# Patient Record
Sex: FEMALE | Race: WHITE | NOT HISPANIC OR LATINO | Employment: UNEMPLOYED | URBAN - METROPOLITAN AREA
[De-identification: names, ages, dates, MRNs, and addresses within clinical notes are randomized per-mention and may not be internally consistent; named-entity substitution may affect disease eponyms.]

---

## 2017-04-19 LAB
C TRACH RRNA SPEC DONR QL NAA+PROBE: NORMAL
EXTERNAL ABO GROUPING: (no result)
EXTERNAL ANTIBODY SCREEN: NORMAL
EXTERNAL HEMATOCRIT: 38 %
EXTERNAL HEMOGLOBIN: 12.7 G/DL
EXTERNAL HEPATITIS B SURFACE ANTIGEN: NEGATIVE
EXTERNAL RH FACTOR: NEGATIVE
EXTERNAL SYPHILIS RPR SCREEN: (no result)
HIV 1+2 AB+HIV1 P24 AG SERPL QL IA: NON REACTIVE
N GONORRHOEA DNA SPEC QL NAA+PROBE: NORMAL
RUBV IGG SERPL IA-ACNC: (no result)

## 2017-07-20 LAB — GLUCOSE 1H P 100 G GLC PO SERPL-MCNC: 111 MG/DL

## 2017-09-22 ENCOUNTER — TELEPHONE (OUTPATIENT)
Dept: OBSTETRICS AND GYNECOLOGY | Facility: CLINIC | Age: 22
End: 2017-09-22

## 2017-09-22 NOTE — TELEPHONE ENCOUNTER
----- Message from Irlanda Montgomery sent at 9/22/2017 12:01 PM EDT -----  Contact: Pt  Ob pt called to schedule ON appt.  States she just moved here from [facility], is 32-34 weeks gestation, with no high risk factors.  Pt will come in today or Monday to sign HOME.  Is it ok to schedule pt with you?    Pt # [phone]

## 2017-09-26 ENCOUNTER — INITIAL PRENATAL (OUTPATIENT)
Dept: OBSTETRICS AND GYNECOLOGY | Facility: CLINIC | Age: 22
End: 2017-09-26

## 2017-09-26 VITALS
DIASTOLIC BLOOD PRESSURE: 76 MMHG | SYSTOLIC BLOOD PRESSURE: 123 MMHG | HEIGHT: 61 IN | BODY MASS INDEX: 32.66 KG/M2 | WEIGHT: 173 LBS

## 2017-09-26 DIAGNOSIS — Z34.83 MULTIGRAVIDA IN THIRD TRIMESTER: Primary | ICD-10-CM

## 2017-09-26 DIAGNOSIS — O99.333 TOBACCO SMOKING AFFECTING PREGNANCY IN THIRD TRIMESTER: ICD-10-CM

## 2017-09-26 DIAGNOSIS — Z23 NEED FOR TDAP VACCINATION: ICD-10-CM

## 2017-09-26 DIAGNOSIS — Z11.3 SCREEN FOR STD (SEXUALLY TRANSMITTED DISEASE): ICD-10-CM

## 2017-09-26 DIAGNOSIS — F19.91 HISTORY OF INTRAVENOUS DRUG USE IN REMISSION: ICD-10-CM

## 2017-09-26 DIAGNOSIS — F15.11 HISTORY OF METHAMPHETAMINE ABUSE (HCC): ICD-10-CM

## 2017-09-26 DIAGNOSIS — Z11.4 SCREENING FOR HIV (HUMAN IMMUNODEFICIENCY VIRUS): ICD-10-CM

## 2017-09-26 PROCEDURE — 99204 OFFICE O/P NEW MOD 45 MIN: CPT | Performed by: OBSTETRICS & GYNECOLOGY

## 2017-09-26 PROCEDURE — 90715 TDAP VACCINE 7 YRS/> IM: CPT | Performed by: OBSTETRICS & GYNECOLOGY

## 2017-09-26 PROCEDURE — 90471 IMMUNIZATION ADMIN: CPT | Performed by: OBSTETRICS & GYNECOLOGY

## 2017-09-26 RX ORDER — PRENATAL VIT/IRON FUM/FOLIC AC 27MG-0.8MG
TABLET ORAL DAILY
COMMUNITY
End: 2017-11-02

## 2017-09-26 NOTE — PROGRESS NOTES
Chief complaint: Pregnancy  History present illness: Patient is seen today for the first time this pregnancy by me.  She has had prenatal care at another facility, but recently moved to this area.  She does not have her prenatal records with her today.  Patient is without major complaints at this time.  She reports that her pregnancy has been uncomplicated to this point.  She does report a history of fast labor in her last delivery.  Patient does admit to a history of methamphetamine use and IV drug use prior to this pregnancy.  She reports that she quit just before this pregnancy.  She denies any drug use during this pregnancy.  She is unsure if she is ever been tested for hepatitis C.  She is a smoker.  She has cut down to about a half pack per day.    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     Social History Main Topics   • Smoking status: Current Every Day Smoker     Packs/day: 0.50   • Smokeless tobacco: Never Used      Comment: Discussed risks of smoking in pregnancy; full cessation encouraged   • Alcohol use Yes      Comment: Former - quit at the start of pregnancy   • Drug use: Yes     Special: Methamphetamines      Comment: Pt reports she quit at the start of the pregnancy  "  • Sexual activity: Yes     Other Topics Concern   • Not on file     Social History Narrative   • No narrative on file     Meds:  PNV  Tylenol    Allergies   Allergen Reactions   • Morphine And Related    Pt is able to tolerate oxycodone and hydrocodone with no effects    ROS:  General: No fever or chills  Constitutional: No weight loss or gain, no hair loss  HENT: No headache, no hearing loss, no tinnitus  Eyes: normal vision, no eye pain  Lungs: No cough, no shortness of breath  Heart: No chest pain, no palpitations  Abdomen: No nausea, vomiting, constipation or diarrhea  : No dysuria, no hematuria  Skin: No rashes  Lymph: No swelling  Neuro: No parathesia, no weakness  Psych: Normal though content, no hallucinations, no SI/HI    PE:  Vitals:    17 0959 17 1001   BP: 123/76    Weight: 173 lb (78.5 kg)    Height:  61\" (154.9 cm)   See prenatal physical in flowsheet    Assessment:  1.  21-year-old  2 para 1 at 33-6/7 weeks gestational age based on last menstrual period and consistent with a 20 week ultrasound  2.  History of methamphetamine use and IV drug abuse  3.  Tobacco usage in pregnancy  4.  Depression and anxiety, psychiatric illness in pregnancy    Plan:  1.  Initial pregnancy counseling performed today.  There was some question about the patient's dating.  Initially, we had her last menstrual period of  2/10/17, which I believe was an error.  Her initial ultrasound in the outside setting shows a last visit.  Of 17, which gives an estimated due date of 17, which is consistent with her ultrasound on 17.  I was unable to obtain her records until after the patient had already left the office.  I have reviewed her records at length.  I have updated are flowsheet with those records.  2.  We've discussed the risk of drug usage in pregnancy.  Specifically, I discussed the risks of methamphetamine use including the risk of fetal growth restriction,  labor,  " delivery, placental abruption, hypertensive disorders of pregnancy, etc.  Complete abstinence is recommended.  3.  Discussed the risks of tobacco usage during pregnancy.  Complete cessation of tobacco products is recommended.  4.  We will obtain an ultrasound at her next visit to evaluate for fetal growth given her history of drug use and tobacco usage.  5.  I'll repeat her drug screen today.  I would also repeat HIV and hepatitis C testing today.  6.  Refills of prenatal vitamins were provided today.  7.  Patient reports a history of depression and anxiety.  She reports that she was treated with Depakote prior to this pregnancy, but discontinued it because of side effects.  She has also previously taken Prozac for control of her depression and anxiety, which she reports has been helpful in the past.  She does report a history of postpartum depression after her last delivery.  She would like to plan to start Prozac again after delivery.  8.  Return to the office 2 weeks.  Ultrasound next visit for evaluation of growth.  I spent 30 out of 45 minutes with the patient in face to face counseling of the above issues.

## 2017-09-27 LAB
HCV AB S/CO SERPL IA: <0.1 S/CO RATIO (ref 0–0.9)
HIV 1+2 AB+HIV1 P24 AG SERPL QL IA: NON REACTIVE

## 2017-09-29 LAB
AMPHETAMINES SERPL QL SCN: NEGATIVE NG/ML
BARBITURATES SERPL QL SCN: NEGATIVE UG/ML
BENZODIAZ SERPL QL SCN: NEGATIVE NG/ML
CANNABINOIDS SERPL QL SCN: NEGATIVE NG/ML
COCAINE+BZE SERPL QL SCN: NEGATIVE NG/ML
METHADONE SERPL QL SCN: NEGATIVE NG/ML
OPIATES SERPL QL SCN: NEGATIVE NG/ML
OXYCODONE+OXYMORPHONE SERPLBLD QL SCN: NEGATIVE NG/ML
PCP SERPL QL SCN: NEGATIVE NG/ML
PROPOXYPH SERPL QL SCN: NEGATIVE NG/ML

## 2017-10-12 ENCOUNTER — ROUTINE PRENATAL (OUTPATIENT)
Dept: OBSTETRICS AND GYNECOLOGY | Facility: CLINIC | Age: 22
End: 2017-10-12

## 2017-10-12 ENCOUNTER — PROCEDURE VISIT (OUTPATIENT)
Dept: OBSTETRICS AND GYNECOLOGY | Facility: CLINIC | Age: 22
End: 2017-10-12

## 2017-10-12 VITALS — DIASTOLIC BLOOD PRESSURE: 74 MMHG | SYSTOLIC BLOOD PRESSURE: 105 MMHG | WEIGHT: 176 LBS | BODY MASS INDEX: 33.25 KG/M2

## 2017-10-12 DIAGNOSIS — O26.843 UTERINE SIZE DATE DISCREPANCY PREGNANCY, THIRD TRIMESTER: Primary | ICD-10-CM

## 2017-10-12 DIAGNOSIS — Z34.83 MULTIGRAVIDA IN THIRD TRIMESTER: Primary | ICD-10-CM

## 2017-10-12 DIAGNOSIS — O99.333 TOBACCO SMOKING AFFECTING PREGNANCY IN THIRD TRIMESTER: ICD-10-CM

## 2017-10-12 PROBLEM — Z11.4 SCREENING FOR HIV (HUMAN IMMUNODEFICIENCY VIRUS): Status: RESOLVED | Noted: 2017-09-26 | Resolved: 2017-10-12

## 2017-10-12 PROBLEM — Z23 NEED FOR TDAP VACCINATION: Status: RESOLVED | Noted: 2017-09-26 | Resolved: 2017-10-12

## 2017-10-12 PROBLEM — Z11.3 SCREEN FOR STD (SEXUALLY TRANSMITTED DISEASE): Status: RESOLVED | Noted: 2017-09-26 | Resolved: 2017-10-12

## 2017-10-12 PROCEDURE — 99213 OFFICE O/P EST LOW 20 MIN: CPT | Performed by: OBSTETRICS & GYNECOLOGY

## 2017-10-12 PROCEDURE — 76816 OB US FOLLOW-UP PER FETUS: CPT | Performed by: OBSTETRICS & GYNECOLOGY

## 2017-10-12 NOTE — PROGRESS NOTES
Complaint: Pregnancy, pelvic pressure  History present was: Patient is here for her routine prenatal visit.  She notes some pelvic pressure and also some occasional mild contractions.  Good fetal movement.  No vaginal bleeding or leakage of fluid.  Objective: See vital signs in flowsheet  Gen.: No acute distress, awake and oriented ×3  Abdomen: Soft, nontender, fetal heart tones 155  Cervix: 2 cm dilated, 30% effaced, -2 station  Extremities: No lower extremity edema, no tenderness  Labs: Repeat hepatitis C and HIV tests were negative  Assessment:  1.  22-year-old  2 para 1 at 36 and one sevenths weeks gestational age  2.  Previous history of methamphetamine and opioid abuse, none currently  Plan:  1.  We discussed ultrasound findings today.  We also discussed patient's dating criteria.  I feel the patient is most accurately dated by her early ultrasound in 2017, giving an estimated due date of 17.  Findings discussed with patient.  Recent lab results discussed as well.  GBS performed today.  Labor signs discussed.  Return to the office in 1 week.  I spent 10 out of 15 minutes with the patient in face to face counseling of the above issues.

## 2017-10-13 ENCOUNTER — TELEPHONE (OUTPATIENT)
Dept: OBSTETRICS AND GYNECOLOGY | Facility: CLINIC | Age: 22
End: 2017-10-13

## 2017-10-13 NOTE — TELEPHONE ENCOUNTER
----- Message from Oksana Hanna sent at 10/12/2017  4:51 PM EDT -----  Pt called with concerns of having spotting after ob f/u today, per Mariza advise pt that is normal to have little spotting after exam. If spotting get any worse over night she needs to go to [facility] ER.

## 2017-10-17 LAB — B-HEM STREP SPEC QL CULT: POSITIVE

## 2017-10-19 ENCOUNTER — ROUTINE PRENATAL (OUTPATIENT)
Dept: OBSTETRICS AND GYNECOLOGY | Facility: CLINIC | Age: 22
End: 2017-10-19

## 2017-10-19 VITALS — BODY MASS INDEX: 33.44 KG/M2 | DIASTOLIC BLOOD PRESSURE: 68 MMHG | WEIGHT: 177 LBS | SYSTOLIC BLOOD PRESSURE: 116 MMHG

## 2017-10-19 DIAGNOSIS — O99.820 GROUP B STREPTOCOCCAL CARRIAGE COMPLICATING PREGNANCY: ICD-10-CM

## 2017-10-19 DIAGNOSIS — Z23 NEEDS FLU SHOT: Primary | ICD-10-CM

## 2017-10-19 DIAGNOSIS — Z34.83 MULTIGRAVIDA IN THIRD TRIMESTER: ICD-10-CM

## 2017-10-19 PROCEDURE — 90471 IMMUNIZATION ADMIN: CPT | Performed by: OBSTETRICS & GYNECOLOGY

## 2017-10-19 PROCEDURE — 90656 IIV3 VACC NO PRSV 0.5 ML IM: CPT | Performed by: OBSTETRICS & GYNECOLOGY

## 2017-10-19 PROCEDURE — 99213 OFFICE O/P EST LOW 20 MIN: CPT | Performed by: OBSTETRICS & GYNECOLOGY

## 2017-10-19 NOTE — PROGRESS NOTES
Pt received flu vaccine lot# [id number] exp: 6/30/2018 NDC# [id number]. Rt deltoid. No reaction. MORTEZA

## 2017-10-19 NOTE — PROGRESS NOTES
Chief complaint: Pregnancy  History present was: Patient is here for her routine prenatal visit.  She has no major complaints.  She reports some pelvic pressure.  No contractions.  Good fetal movement.  No vaginal discharge or bleeding today.  Objective: See vital signs in flowsheet  Gen.: No acute distress, awake and oriented ×3  Abdomen: Soft, nontender, fetal heart tones 145  Cervix: 2 cm, 30% effaced, -2 station  Extremities: No lower extremity edema, no tenderness  Labs: GBS positive  Assessment:  1.  22-year-old  2 para 1 at 37 and one sevenths weeks gestational age  Plan:  1.  Labor signs discussed with the patient.  GBS results discussed.  Discussed need for penicillin in labor.  All questions answered.  Return to the office in 1 week.  I spent 10 out of 15 minutes with the patient in face to face counseling of the above issues.

## 2017-10-26 ENCOUNTER — ROUTINE PRENATAL (OUTPATIENT)
Dept: OBSTETRICS AND GYNECOLOGY | Facility: CLINIC | Age: 22
End: 2017-10-26

## 2017-10-26 VITALS — SYSTOLIC BLOOD PRESSURE: 112 MMHG | DIASTOLIC BLOOD PRESSURE: 63 MMHG | WEIGHT: 179 LBS | BODY MASS INDEX: 33.82 KG/M2

## 2017-10-26 DIAGNOSIS — Z34.83 MULTIGRAVIDA IN THIRD TRIMESTER: Primary | ICD-10-CM

## 2017-10-26 PROCEDURE — 99213 OFFICE O/P EST LOW 20 MIN: CPT | Performed by: OBSTETRICS & GYNECOLOGY

## 2017-10-26 NOTE — PROGRESS NOTES
Chief complaint: Pregnancy  History of present illness: Patient is here for her routine prenatal visit.  She has no major complaints.  She notes some mild low back pain at night.  Occasional contractions.  No vaginal bleeding or leakage of fluid.  Good fetal movement  Objective: See vital signs in flowsheet  Gen.: No acute distress, awake and oriented ×3  Abdomen: Soft, nontender, nondistended, fundal height 38 tenderness, fetal heart tones 140  Cervix: 3 cm dilated, 30% effaced, -2 station  Extremities: 1+ lower extremity edema, no tenderness  Assessment:  1.  22-year-old  2 para 1 at 38 and one sevenths weeks gestational age  2.  GBS positive  3.  Rh-  4.  Remote history of IV drug abuse and methamphetamine use, none this pregnancy  Plan:  1.  Labor signs discussed with the patient.  Discussed options of expectant management of pregnancy versus labor induction.  The patient prefers to wait spontaneous labor.  This is the best plan.  Labor signs discussed.  Return to the office in 1 week.  I spent 10 out of 15 minutes with the patient in face to face counseling of the above issues.

## 2017-11-02 ENCOUNTER — ROUTINE PRENATAL (OUTPATIENT)
Dept: OBSTETRICS AND GYNECOLOGY | Facility: CLINIC | Age: 22
End: 2017-11-02

## 2017-11-02 VITALS — SYSTOLIC BLOOD PRESSURE: 119 MMHG | WEIGHT: 181 LBS | BODY MASS INDEX: 34.2 KG/M2 | DIASTOLIC BLOOD PRESSURE: 67 MMHG

## 2017-11-02 DIAGNOSIS — Z34.83 MULTIGRAVIDA IN THIRD TRIMESTER: Primary | ICD-10-CM

## 2017-11-02 PROCEDURE — 99213 OFFICE O/P EST LOW 20 MIN: CPT | Performed by: OBSTETRICS & GYNECOLOGY

## 2017-11-02 NOTE — PROGRESS NOTES
Chief complaint: Pregnancy  History present was: Patient is here for routine prenatal visit.  She has no major complaints.  Good fetal movement.  No leakage of fluid.  Otherwise doing well.  Objective: See vital signs in flowsheet  Gen.: No acute distress, awake and oriented ×3  Abdomen: Soft, nontender, fetal heart tones 155  Cervix: 3 cm dilated, 40% effaced, -2 station; membranes stripped, scant bloody discharge, no significant bleeding  Extremities: 1+ lower extremity edema, no tenderness  Assessment:  1.  22-year-old  2 para 1 at 39 and one sevenths weeks gestational age  Plan:  1.  Labor signs discussed with the patient.  Precautions given.  If no labor, return to the office in 1 week for routine follow-up.  I spent 10 out of 15 minutes with the patient in face to face counseling of the above issues.

## 2017-11-07 ENCOUNTER — TELEPHONE (OUTPATIENT)
Dept: OBSTETRICS AND GYNECOLOGY | Facility: CLINIC | Age: 22
End: 2017-11-07

## 2017-11-07 NOTE — TELEPHONE ENCOUNTER
----- Message from Cynthia Reyer sent at 11/7/2017  2:04 PM EST -----  Pt says every day since Thursday when you stripped her membranes she has been leaking slimy fluid. She describes it as not enough to soak her underwear but to be bothersome. She is also sick with a cold. Symptoms are stuffy nose & coughing. She is using a nasal spray. Is it going to be a problem when she delivers that she is sick? [phone] is the best # to reach her while her phone is turned off.

## 2017-11-07 NOTE — TELEPHONE ENCOUNTER
It does not seem like her cold symptoms are severe enough to be a problem.  His only she is not having fevers more than 101, trouble breathing, severe pain.  She should be okay.  Mucousy discharge is normal.  If she is having copious discharge enough to soak her clothes, heavy vaginal bleeding, or regular painful contractions she should go to labor and delivery to be evaluated.

## 2017-11-09 ENCOUNTER — HOSPITAL ENCOUNTER (INPATIENT)
Facility: HOSPITAL | Age: 22
LOS: 2 days | Discharge: HOME | End: 2017-11-11
Attending: OBSTETRICS & GYNECOLOGY | Admitting: OBSTETRICS & GYNECOLOGY

## 2017-11-09 ENCOUNTER — ANESTHESIA EVENT (OUTPATIENT)
Dept: LABOR AND DELIVERY | Facility: HOSPITAL | Age: 22
End: 2017-11-09

## 2017-11-09 ENCOUNTER — ANESTHESIA (OUTPATIENT)
Dept: LABOR AND DELIVERY | Facility: HOSPITAL | Age: 22
End: 2017-11-09

## 2017-11-09 DIAGNOSIS — Z86.59 HISTORY OF DEPRESSION: ICD-10-CM

## 2017-11-09 PROBLEM — Z34.90 PREGNANCY: Status: ACTIVE | Noted: 2017-11-09

## 2017-11-09 LAB
ABO GROUP BLD: NORMAL
ABO GROUP BLD: NORMAL
AMPHET+METHAMPHET UR QL: NEGATIVE
BARBITURATES UR QL SCN: NEGATIVE
BENZODIAZ UR QL SCN: NEGATIVE
BLD GP AB SCN SERPL QL: NEGATIVE
CANNABINOIDS SERPL QL: NEGATIVE
COCAINE UR QL: NEGATIVE
DEPRECATED RDW RBC AUTO: 47.1 FL (ref 37–54)
ERYTHROCYTE [DISTWIDTH] IN BLOOD BY AUTOMATED COUNT: 14.2 % (ref 11.7–13)
EXPIRATION DATE: ABNORMAL
HCT VFR BLD AUTO: 37.4 % (ref 35.6–45.5)
HGB BLD-MCNC: 12.2 G/DL (ref 11.9–15.5)
Lab: ABNORMAL
MCH RBC QN AUTO: 29.4 PG (ref 26.9–32)
MCHC RBC AUTO-ENTMCNC: 32.6 G/DL (ref 32.4–36.3)
MCV RBC AUTO: 90.1 FL (ref 80.5–98.2)
METHADONE UR QL SCN: NEGATIVE
OPIATES UR QL: NEGATIVE
OXYCODONE UR QL SCN: NEGATIVE
PLATELET # BLD AUTO: 202 10*3/MM3 (ref 140–500)
PMV BLD AUTO: 10.9 FL (ref 6–12)
PROT UR STRIP-MCNC: ABNORMAL MG/DL
RBC # BLD AUTO: 4.15 10*6/MM3 (ref 3.9–5.2)
RH BLD: NEGATIVE
RH BLD: NEGATIVE
WBC NRBC COR # BLD: 18 10*3/MM3 (ref 4.5–10.7)

## 2017-11-09 PROCEDURE — 80307 DRUG TEST PRSMV CHEM ANLYZR: CPT | Performed by: OBSTETRICS & GYNECOLOGY

## 2017-11-09 PROCEDURE — 25010000002 PENICILLIN G POTASSIUM PER 600000 UNITS: Performed by: OBSTETRICS & GYNECOLOGY

## 2017-11-09 PROCEDURE — 86901 BLOOD TYPING SEROLOGIC RH(D): CPT | Performed by: OBSTETRICS & GYNECOLOGY

## 2017-11-09 PROCEDURE — 86900 BLOOD TYPING SEROLOGIC ABO: CPT | Performed by: OBSTETRICS & GYNECOLOGY

## 2017-11-09 PROCEDURE — 0HQ9XZZ REPAIR PERINEUM SKIN, EXTERNAL APPROACH: ICD-10-PCS | Performed by: OBSTETRICS & GYNECOLOGY

## 2017-11-09 PROCEDURE — 86850 RBC ANTIBODY SCREEN: CPT | Performed by: OBSTETRICS & GYNECOLOGY

## 2017-11-09 PROCEDURE — 85027 COMPLETE CBC AUTOMATED: CPT | Performed by: OBSTETRICS & GYNECOLOGY

## 2017-11-09 PROCEDURE — 10907ZC DRAINAGE OF AMNIOTIC FLUID, THERAPEUTIC FROM PRODUCTS OF CONCEPTION, VIA NATURAL OR ARTIFICIAL OPENING: ICD-10-PCS | Performed by: OBSTETRICS & GYNECOLOGY

## 2017-11-09 PROCEDURE — 3E03329 INTRODUCTION OF OTHER ANTI-INFECTIVE INTO PERIPHERAL VEIN, PERCUTANEOUS APPROACH: ICD-10-PCS | Performed by: OBSTETRICS & GYNECOLOGY

## 2017-11-09 PROCEDURE — C1755 CATHETER, INTRASPINAL: HCPCS | Performed by: ANESTHESIOLOGY

## 2017-11-09 PROCEDURE — 81002 URINALYSIS NONAUTO W/O SCOPE: CPT | Performed by: OBSTETRICS & GYNECOLOGY

## 2017-11-09 PROCEDURE — 59409 OBSTETRICAL CARE: CPT | Performed by: OBSTETRICS & GYNECOLOGY

## 2017-11-09 RX ORDER — SODIUM CHLORIDE, SODIUM LACTATE, POTASSIUM CHLORIDE, CALCIUM CHLORIDE 600; 310; 30; 20 MG/100ML; MG/100ML; MG/100ML; MG/100ML
125 INJECTION, SOLUTION INTRAVENOUS CONTINUOUS
Status: DISCONTINUED | OUTPATIENT
Start: 2017-11-09 | End: 2017-11-09

## 2017-11-09 RX ORDER — LANOLIN 100 %
OINTMENT (GRAM) TOPICAL
Status: DISCONTINUED | OUTPATIENT
Start: 2017-11-09 | End: 2017-11-11 | Stop reason: HOSPADM

## 2017-11-09 RX ORDER — DOCUSATE SODIUM 100 MG/1
100 CAPSULE, LIQUID FILLED ORAL 2 TIMES DAILY
Status: DISCONTINUED | OUTPATIENT
Start: 2017-11-09 | End: 2017-11-11 | Stop reason: HOSPADM

## 2017-11-09 RX ORDER — IBUPROFEN 800 MG/1
800 TABLET ORAL EVERY 8 HOURS PRN
Status: DISCONTINUED | OUTPATIENT
Start: 2017-11-09 | End: 2017-11-11 | Stop reason: HOSPADM

## 2017-11-09 RX ORDER — OXYTOCIN/RINGER'S LACTATE 10/500ML
2-20 PLASTIC BAG, INJECTION (ML) INTRAVENOUS
Status: DISCONTINUED | OUTPATIENT
Start: 2017-11-09 | End: 2017-11-09

## 2017-11-09 RX ORDER — BISACODYL 10 MG
10 SUPPOSITORY, RECTAL RECTAL DAILY PRN
Status: DISCONTINUED | OUTPATIENT
Start: 2017-11-10 | End: 2017-11-11 | Stop reason: HOSPADM

## 2017-11-09 RX ORDER — CARBOPROST TROMETHAMINE 250 UG/ML
250 INJECTION, SOLUTION INTRAMUSCULAR AS NEEDED
Status: DISCONTINUED | OUTPATIENT
Start: 2017-11-09 | End: 2017-11-09 | Stop reason: HOSPADM

## 2017-11-09 RX ORDER — HYDROCODONE BITARTRATE AND ACETAMINOPHEN 5; 325 MG/1; MG/1
1 TABLET ORAL EVERY 6 HOURS PRN
Status: DISCONTINUED | OUTPATIENT
Start: 2017-11-09 | End: 2017-11-11 | Stop reason: HOSPADM

## 2017-11-09 RX ORDER — MISOPROSTOL 200 UG/1
TABLET ORAL
Status: COMPLETED
Start: 2017-11-09 | End: 2017-11-09

## 2017-11-09 RX ORDER — PROMETHAZINE HYDROCHLORIDE 12.5 MG/1
12.5 TABLET ORAL EVERY 4 HOURS PRN
Status: DISCONTINUED | OUTPATIENT
Start: 2017-11-09 | End: 2017-11-11 | Stop reason: HOSPADM

## 2017-11-09 RX ORDER — MISOPROSTOL 200 UG/1
800 TABLET ORAL AS NEEDED
Status: DISCONTINUED | OUTPATIENT
Start: 2017-11-09 | End: 2017-11-11 | Stop reason: HOSPADM

## 2017-11-09 RX ORDER — METHYLERGONOVINE MALEATE 0.2 MG/ML
200 INJECTION INTRAVENOUS ONCE AS NEEDED
Status: DISCONTINUED | OUTPATIENT
Start: 2017-11-09 | End: 2017-11-09 | Stop reason: HOSPADM

## 2017-11-09 RX ORDER — OXYTOCIN/RINGER'S LACTATE 10/500ML
125 PLASTIC BAG, INJECTION (ML) INTRAVENOUS CONTINUOUS PRN
Status: DISCONTINUED | OUTPATIENT
Start: 2017-11-09 | End: 2017-11-09 | Stop reason: HOSPADM

## 2017-11-09 RX ORDER — MISOPROSTOL 200 UG/1
800 TABLET ORAL AS NEEDED
Status: DISCONTINUED | OUTPATIENT
Start: 2017-11-09 | End: 2017-11-09 | Stop reason: HOSPADM

## 2017-11-09 RX ORDER — LIDOCAINE HYDROCHLORIDE 10 MG/ML
5 INJECTION, SOLUTION INFILTRATION; PERINEURAL AS NEEDED
Status: DISCONTINUED | OUTPATIENT
Start: 2017-11-09 | End: 2017-11-09 | Stop reason: HOSPADM

## 2017-11-09 RX ORDER — ZOLPIDEM TARTRATE 5 MG/1
5 TABLET ORAL NIGHTLY PRN
Status: DISCONTINUED | OUTPATIENT
Start: 2017-11-09 | End: 2017-11-11 | Stop reason: HOSPADM

## 2017-11-09 RX ORDER — OXYTOCIN/RINGER'S LACTATE 10/500ML
999 PLASTIC BAG, INJECTION (ML) INTRAVENOUS ONCE
Status: DISCONTINUED | OUTPATIENT
Start: 2017-11-09 | End: 2017-11-09 | Stop reason: HOSPADM

## 2017-11-09 RX ORDER — ONDANSETRON 2 MG/ML
4 INJECTION INTRAMUSCULAR; INTRAVENOUS ONCE AS NEEDED
Status: DISCONTINUED | OUTPATIENT
Start: 2017-11-09 | End: 2017-11-09 | Stop reason: HOSPADM

## 2017-11-09 RX ORDER — PENICILLIN G 3000000 [IU]/50ML
3 INJECTION, SOLUTION INTRAVENOUS EVERY 4 HOURS
Status: DISCONTINUED | OUTPATIENT
Start: 2017-11-09 | End: 2017-11-09 | Stop reason: HOSPADM

## 2017-11-09 RX ORDER — SODIUM CHLORIDE 0.9 % (FLUSH) 0.9 %
1-10 SYRINGE (ML) INJECTION AS NEEDED
Status: DISCONTINUED | OUTPATIENT
Start: 2017-11-09 | End: 2017-11-11 | Stop reason: HOSPADM

## 2017-11-09 RX ORDER — SODIUM CHLORIDE 0.9 % (FLUSH) 0.9 %
1-10 SYRINGE (ML) INJECTION AS NEEDED
Status: DISCONTINUED | OUTPATIENT
Start: 2017-11-09 | End: 2017-11-09 | Stop reason: HOSPADM

## 2017-11-09 RX ORDER — LIDOCAINE HYDROCHLORIDE 15 MG/ML
INJECTION, SOLUTION EPIDURAL; INFILTRATION; INTRACAUDAL; PERINEURAL AS NEEDED
Status: DISCONTINUED | OUTPATIENT
Start: 2017-11-09 | End: 2017-11-09 | Stop reason: SURG

## 2017-11-09 RX ORDER — PHYTONADIONE 1 MG/.5ML
INJECTION, EMULSION INTRAMUSCULAR; INTRAVENOUS; SUBCUTANEOUS
Status: DISPENSED
Start: 2017-11-09 | End: 2017-11-09

## 2017-11-09 RX ORDER — EPHEDRINE SULFATE 50 MG/ML
5 INJECTION, SOLUTION INTRAVENOUS AS NEEDED
Status: DISCONTINUED | OUTPATIENT
Start: 2017-11-09 | End: 2017-11-09 | Stop reason: HOSPADM

## 2017-11-09 RX ORDER — ERYTHROMYCIN 5 MG/G
OINTMENT OPHTHALMIC
Status: DISPENSED
Start: 2017-11-09 | End: 2017-11-09

## 2017-11-09 RX ORDER — FAMOTIDINE 10 MG/ML
20 INJECTION, SOLUTION INTRAVENOUS ONCE AS NEEDED
Status: DISCONTINUED | OUTPATIENT
Start: 2017-11-09 | End: 2017-11-09 | Stop reason: HOSPADM

## 2017-11-09 RX ORDER — PRENATAL VIT NO.126/IRON/FOLIC 28MG-0.8MG
1 TABLET ORAL DAILY
Status: DISCONTINUED | OUTPATIENT
Start: 2017-11-09 | End: 2017-11-11 | Stop reason: HOSPADM

## 2017-11-09 RX ORDER — DIPHENHYDRAMINE HYDROCHLORIDE 50 MG/ML
12.5 INJECTION INTRAMUSCULAR; INTRAVENOUS EVERY 8 HOURS PRN
Status: DISCONTINUED | OUTPATIENT
Start: 2017-11-09 | End: 2017-11-09 | Stop reason: HOSPADM

## 2017-11-09 RX ADMIN — OXYTOCIN 125 ML/HR: 10 INJECTION INTRAVENOUS at 09:21

## 2017-11-09 RX ADMIN — SODIUM CHLORIDE, POTASSIUM CHLORIDE, SODIUM LACTATE AND CALCIUM CHLORIDE 125 ML/HR: 600; 310; 30; 20 INJECTION, SOLUTION INTRAVENOUS at 04:57

## 2017-11-09 RX ADMIN — BENZOCAINE AND MENTHOL 1 APPLICATION: 20; .5 SPRAY TOPICAL at 12:35

## 2017-11-09 RX ADMIN — DOCUSATE SODIUM 100 MG: 100 CAPSULE, LIQUID FILLED ORAL at 18:38

## 2017-11-09 RX ADMIN — HYDROCODONE BITARTRATE AND ACETAMINOPHEN 1 TABLET: 5; 325 TABLET ORAL at 12:36

## 2017-11-09 RX ADMIN — LIDOCAINE HYDROCHLORIDE 2 ML: 15 INJECTION, SOLUTION EPIDURAL; INFILTRATION; INTRACAUDAL; PERINEURAL at 08:36

## 2017-11-09 RX ADMIN — OXYTOCIN 2 MILLI-UNITS/MIN: 10 INJECTION INTRAVENOUS at 06:43

## 2017-11-09 RX ADMIN — DOCUSATE SODIUM 100 MG: 100 CAPSULE, LIQUID FILLED ORAL at 12:35

## 2017-11-09 RX ADMIN — MISOPROSTOL 800 MCG: 200 TABLET ORAL at 09:25

## 2017-11-09 RX ADMIN — IBUPROFEN 800 MG: 800 TABLET ORAL at 12:36

## 2017-11-09 RX ADMIN — PENICILLIN G 3 MILLION UNITS: 3000000 INJECTION, SOLUTION INTRAVENOUS at 09:01

## 2017-11-09 RX ADMIN — SODIUM CHLORIDE, POTASSIUM CHLORIDE, SODIUM LACTATE AND CALCIUM CHLORIDE 125 ML/HR: 600; 310; 30; 20 INJECTION, SOLUTION INTRAVENOUS at 08:41

## 2017-11-09 RX ADMIN — HYDROCODONE BITARTRATE AND ACETAMINOPHEN 1 TABLET: 5; 325 TABLET ORAL at 18:38

## 2017-11-09 RX ADMIN — Medication 10 ML/HR: at 08:39

## 2017-11-09 RX ADMIN — PENICILLIN G POTASSIUM 5 MILLION UNITS: 5000000 POWDER, FOR SOLUTION INTRAMUSCULAR; INTRAPLEURAL; INTRATHECAL; INTRAVENOUS at 05:07

## 2017-11-09 RX ADMIN — LIDOCAINE HYDROCHLORIDE 4 ML: 15 INJECTION, SOLUTION EPIDURAL; INFILTRATION; INTRACAUDAL; PERINEURAL at 08:34

## 2017-11-09 RX ADMIN — EPHEDRINE SULFATE 5 MG: 50 INJECTION, SOLUTION INTRAVENOUS at 08:58

## 2017-11-09 RX ADMIN — GUAIFENESIN 200 MG: 100 SOLUTION ORAL at 09:40

## 2017-11-09 RX ADMIN — Medication 1 TABLET: at 12:36

## 2017-11-09 RX ADMIN — Medication 1 APPLICATION: at 12:35

## 2017-11-09 NOTE — ANESTHESIA PREPROCEDURE EVALUATION
Anesthesia Evaluation     Patient summary reviewed and Nursing notes reviewed          Airway   Dental      Pulmonary    (+) a smoker Current,   Cardiovascular - negative cardio ROS        Neuro/Psych  (+) psychiatric history Schizophrenia and Bipolar,    GI/Hepatic/Renal/Endo - negative ROS     Musculoskeletal (-) negative ROS    Abdominal    Substance History   (+) drug use     OB/GYN    (+) Pregnant,         Other - negative ROS                                       Anesthesia Plan    ASA 3     epidural     Anesthetic plan and risks discussed with patient.

## 2017-11-09 NOTE — PLAN OF CARE
Problem: Patient Care Overview (Adult)  Goal: Plan of Care Review  Outcome: Ongoing (interventions implemented as appropriate)    17 0650   Patient Care Overview   Progress progress toward functional goals as expected   Coping/Psychosocial Response Interventions   Plan Of Care Reviewed With patient       Goal: Adult Individualization and Mutuality  Outcome: Ongoing (interventions implemented as appropriate)    17   Individualization   Patient Specific Preferences --  Vaginal delivery, LILA at delivery   Patient Specific Goals --  Healthy mom and baby   Patient Specific Interventions --  Pitocin for labor augmentation   Mutuality/Individual Preferences   What Anxieties, Fears or Concerns Do You Have About Your Health or Care? Not sure how this labor will go, unsure if she wants an epidural --    What Questions Do You Have About Your Health or Care? How long will this labor last? --    What Information Would Help Us Give You More Personalized Care? Labor pain mangement techniques --        Goal: Discharge Needs Assessment  Outcome: Ongoing (interventions implemented as appropriate)    17   Discharge Needs Assessment   Concerns To Be Addressed denies needs/concerns at this time;no discharge needs identified         Problem: Labor (Cervical Ripen, Induct, Augment) (Adult,Obstetrics,Pediatric)  Goal: Signs and Symptoms of Listed Potential Problems Will be Absent or Manageable (Labor)  Outcome: Ongoing (interventions implemented as appropriate)    17   Labor (Cervical Ripen, Induct, Augment)   Problems Assessed (Labor) all   Problems Present (Labor) none         Problem: Fall Risk  (Adult,Obstetrics,Pediatric)  Goal: Identify Related Risk Factors and Signs and Symptoms  Outcome: Ongoing (interventions implemented as appropriate)    17 06   Fall Risk    Fall Risk: Related Risk Factors balance change;medical devices;pregnancy weight gain     Fall Risk: Signs and Symptoms presence of fall risk factors       Goal: Absence of Maternal Fall  Outcome: Ongoing (interventions implemented as appropriate)    17 0650   Fall Risk  (Adult,Obstetrics,Pediatric)   Absence of Maternal Fall making progress toward outcome       Goal: Absence of [place] Fall/Drop  Outcome: Ongoing (interventions implemented as appropriate)    17 0650   Fall Risk  (Adult,Obstetrics,Pediatric)   Absence of [place] Fall/Drop making progress toward outcome

## 2017-11-09 NOTE — PLAN OF CARE
Problem: Anesthesia/Analgesia, Neuraxial (Obstetrics)  Goal: Signs and Symptoms of Listed Potential Problems Will be Absent or Manageable (Anesthesia/Analgesia, Neuraxial)  Outcome: Ongoing (interventions implemented as appropriate)    11/09/17 1033   Anesthesia/Analgesia, Neuraxial   Problems Assessed (Neuraxial Anesthesia/Analgesia, OB) all   Problems Present (Neuraxial Anesthesia/Analgesia, OB) none

## 2017-11-09 NOTE — ANESTHESIA PROCEDURE NOTES
Labor Epidural    Patient location during procedure: OB  Performed By  Anesthesiologist: PARVEEN ROBERTS  Preanesthetic Checklist  Completed: patient identified, site marked, surgical consent, pre-op evaluation, timeout performed, IV checked, risks and benefits discussed and monitors and equipment checked  Prep:  Pt Position:sitting  Sterile Tech:gloves, cap and sterile barrier  Prep:chlorhexidine gluconate and isopropyl alcohol  Monitoring:continuous pulse oximetry, EKG and blood pressure monitoring  Epidural Block Procedure:  Approach:midline  Guidance:landmark technique and palpation technique  Location:L3-L4  Needle Type:Tuohy  Needle Gauge:17  Loss of Resistance Medium: air  Paresthesia: none  Aspiration:negative  Test Dose:negative  Number of Attempts: 1  Post Assessment:  Dressing:secured with tape and occlusive dressing applied  Pt Tolerance:patient tolerated the procedure well with no apparent complications  Complications:no

## 2017-11-09 NOTE — PLAN OF CARE
Problem: Labor (Cervical Ripen, Induct, Augment) (Adult,Obstetrics,Pediatric)  Goal: Signs and Symptoms of Listed Potential Problems Will be Absent or Manageable (Labor)  Outcome: Outcome(s) achieved Date Met:  11/09/17 11/09/17 1134   Labor (Cervical Ripen, Induct, Augment)   Problems Assessed (Labor) all   Problems Present (Labor) none

## 2017-11-09 NOTE — H&P
"[facility]  Obstetric History and Physical    Chief Complaint   Patient presents with   • Lost mucous plug     States she has been having some mucous with pink for about 2 hours, unsure if she is dion, states she did not feel any pain with her contractions with her first labor- she felt \"like her pelvis was going to shatter at 2:39 am, and she was born at 5:19am\". Good fetal movement, denies active bleeding, denies loss of fluid.            Patient is a 22 y.o. female  currently at 40w1d, who presents with labor.    Her prenatal care was with Dr. Mensah and was uncomplicated.        External Prenatal Results         Pregnancy Outside Results - these were transcribed from office records.  See scanned records for details. Date Time   Hgb ^ 12.7 g/dL 17    Hct ^ 38 % 17    ABO ^ B  17    Rh ^ Negative  17    Antibody Screen ^ Normal  17    Glucose Fasting GTT      Glucose Tolerance Test 1 hour      Glucose Tolerance Test 3 hour      Gonorrhea (discrete)      Chlamydia (discrete)      RPR ^ Non-Reactive  17    VDRL      Syphillis Antibody      Rubella      HBsAg ^ Negative  17    Herpes Simplex Virus PCR      Herpes Simplex VIrus Culture      HIV      Hep C RNA Quant PCR      Hep C Antibody      Urine Drug Screen      AFP      Group B Strep      GBS Susceptibility to Clindamycin      GBS Susceptibility to Eythromycin      Fetal Fibronectin      Genetic Testing, Maternal Blood             Legend: ^: Historical             Obstetric History       T1      L1     SAB0   TAB0   Ectopic0   Multiple0   Live Births0       # Outcome Date GA Lbr Royer/2nd Weight Sex Delivery Anes PTL Lv   2 Current            1 Term 10/22/15 40w1d  6 lb 12 oz (3.062 kg) F Vag-Spont None                Past Medical History:   Diagnosis Date   • Depression     Posible personality D/O; 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      No current outpatient prescriptions on file prior to encounter.          Allergies   Allergen Reactions   • Morphine And Related Arrhythmia     Pt able to tolerate oxycodone and hydrocodone          Social History     Social History   • Marital status: Single     Spouse name: N/A   • Number of children: N/A   • Years of education: N/A     Occupational History   • Not on file.     Social History Main Topics   • Smoking status: Current Every Day Smoker     Packs/day: 0.50   • Smokeless tobacco: Never Used      Comment: Discussed risks of smoking in pregnancy; full cessation encouraged   • Alcohol use Yes      Comment: Former - quit at the start of pregnancy   • Drug use: Yes     Special: Methamphetamines      Comment: Pt reports she quit at the start of the pregnancy   • Sexual activity: Yes     Other Topics Concern   • Not on file     Social History Narrative          Family History   Problem Relation Age of Onset   • ALS Father    • Hypertension Mother    • Diabetes Maternal Grandfather         Review of Systems   Constitutional: Negative for chills and fever.   Gastrointestinal: Positive for abdominal pain.   Genitourinary: Positive for pelvic pain. Negative for vaginal bleeding and vaginal discharge.   All other systems reviewed and are negative.      /77  Pulse 100  Temp 98.7 °F (37.1 °C) (Oral)   Resp 16  Ht 61\" (154.9 cm)  Wt 183 lb 6.4 oz (83.2 kg)  LMP 02/01/2017  SpO2 98%  Breastfeeding? No  BMI 34.65 kg/m2    Physical Exam   Constitutional: She is oriented to person, place, and time. She appears well-developed and well-nourished.   HENT:   Head: Normocephalic and atraumatic.   Eyes: Conjunctivae are normal.   Neck: Normal range of motion. Neck supple. No thyromegaly present.   Cardiovascular: Normal rate, regular rhythm and normal heart sounds.    No murmur " heard.  Pulmonary/Chest: Effort normal and breath sounds normal.   Abdominal: Soft. Bowel sounds are normal.   Genitourinary: Pelvic exam was performed with patient supine. There is no lesion on the right labia. There is no lesion on the left labia. Uterus is enlarged (EFW 8#). Cervix exhibits no motion tenderness (7/90/-1, AROM - clear ). Right adnexum displays no mass. Left adnexum displays no mass. No bleeding in the vagina. No vaginal discharge found.   Musculoskeletal: She exhibits no edema.   Lymphadenopathy:        Right: No inguinal adenopathy present.        Left: No inguinal adenopathy present.   Neurological: She is alert and oriented to person, place, and time.   Skin: No rash noted.   Psychiatric: She has a normal mood and affect. Her behavior is normal.       Presentation: Cephalic    Cervix: Exam by: Method: sterile exam per physician   Dilation: Dilation: 7   Effacement: Cervical Effacement: 90%   Station: Station: -1     FHT - Reassuring, class 1  [place] - q 2-3 min     Lab Results   Component Value Date    WBC 18.00 (H) 11/09/2017    HGB 12.2 11/09/2017    HCT 37.4 11/09/2017    MCV 90.1 11/09/2017     11/09/2017       Active Problems:    Pregnancy      Assessment & Plan    Assessment:  1.  Intrauterine pregnancy at 40w1d weeks gestation with reactive fetal status.    2.  labor  without ROM  3.  Obstetrical history significant for GBS .  4.  GBS status: .positive    Plan:  1. fetal and uterine monitoring  continuously, labor augmentation  Pitocin, analgesia with  epidural and antibiotic for GBS  2. Plan of care has been reviewed with patient and family  3.  Risks, benefits of treatment plan have been discussed.  4.  All questions have been answered.        Theodore Stewart MD  11/9/2017  9:09 AM

## 2017-11-09 NOTE — L&D DELIVERY NOTE
[facility]  Vaginal Delivery Note    Delivery    Predelivery Diagnoses: 1) Pregnancy at 40w1d                                          2) GBS positive                    Postdelivery Diagnoses 1) Pregnancy at 40w1d                                            2) GBS positive     Attending :  Theodore Stewart MD     Procedure : Obstetrical controlled vaginal delivery    Delivery Narrative :     Lisa Echavarria is a 22 y.o. year old  @ 40w1d estimated gestational age. She presented to L&D for labor.   Her prenatal care was with Dr. Mensah and was uncomplicated.   Once on L&D her labor progressed well along the labor curve with the aid pitocin, epidural and amniotomy interventions.  Once she was to the second stage, I was called for delivery.     Upon arrive she was prepped in the lithotomy position, and was doing well in her pushing efforts.  With delivery of the fetal head in OA presentation, bulb suction was performed and using gentle downward traction the anterior shoulder delivered without difficulty. One nuchal cord noted.  The infant showed good cry and tone and was placed upon the Mother's abdomen.   After a one minute delay, I then clamped the cord and it was cut by the father of the baby.    Care of the infant was then turned over to the delivery team.   Cord blood was obtained and then using gentle pressure the placenta was delivered spontaneous/intact and noted to have 3 vessel cord.  At that point I undertook inspection of the perineum and vulva and I repaired small first degree hymenal laceration using 3-0 Monocryl in standard fashion with good hemostatic and cosmetic results.       After repair of all lacerations, the area was noted to be hemostatic and all sponge and needle counts were correct. A vaginal exam and rectal exam showed no issues with retained equipment or suture in an abnormal place.      There were two family members noted to be in the room with this patient.            Delivery:  Vaginal, Spontaneous Delivery     YOB: 2017    Time of Birth: 9:19 AM      Anesthesia: Epidural             EBL: 200 mL           Infant    Findings: female  infant     Infant observations: Weight: 7 lb 1.6 oz (3.219 kg)   Length: 19.5  in  Observations/Comments:  scale 4      Apgars: 9   @ 1 minute /    9   @ 5 minutes       Complications  none    Disposition  Mother to Mother Baby/Postpartum  in stable condition currently.  Baby to remains with mom  in stable condition currently.      Theodore Stewart MD  11/09/17  9:51 AM

## 2017-11-09 NOTE — LACTATION NOTE
This note was copied from a baby's chart.  P2 term pt wanting to exclusively pump as she did with her first. HGP brought to room. Visitors in room at present, encouraged to call for education.

## 2017-11-09 NOTE — PLAN OF CARE
Problem: Postpartum, Vaginal Delivery (Adult)  Goal: Signs and Symptoms of Listed Potential Problems Will be Absent or Manageable (Postpartum, Vaginal Delivery)  Outcome: Ongoing (interventions implemented as appropriate)    11/09/17 1032   Postpartum, Vaginal Delivery   Problems Assessed (Postpartum Vaginal Delivery) all   Problems Present (Postpartum Vaginal Delivery) none

## 2017-11-10 LAB
BASOPHILS # BLD AUTO: 0.04 10*3/MM3 (ref 0–0.2)
BASOPHILS NFR BLD AUTO: 0.3 % (ref 0–1.5)
DEPRECATED RDW RBC AUTO: 47.3 FL (ref 37–54)
EOSINOPHIL # BLD AUTO: 0.17 10*3/MM3 (ref 0–0.7)
EOSINOPHIL NFR BLD AUTO: 1.2 % (ref 0.3–6.2)
ERYTHROCYTE [DISTWIDTH] IN BLOOD BY AUTOMATED COUNT: 14.4 % (ref 11.7–13)
HCT VFR BLD AUTO: 33.2 % (ref 35.6–45.5)
HGB BLD-MCNC: 10.7 G/DL (ref 11.9–15.5)
IMM GRANULOCYTES # BLD: 0.15 10*3/MM3 (ref 0–0.03)
IMM GRANULOCYTES NFR BLD: 1 % (ref 0–0.5)
LYMPHOCYTES # BLD AUTO: 3.4 10*3/MM3 (ref 0.9–4.8)
LYMPHOCYTES NFR BLD AUTO: 23.3 % (ref 19.6–45.3)
MCH RBC QN AUTO: 29.1 PG (ref 26.9–32)
MCHC RBC AUTO-ENTMCNC: 32.2 G/DL (ref 32.4–36.3)
MCV RBC AUTO: 90.2 FL (ref 80.5–98.2)
MONOCYTES # BLD AUTO: 1.5 10*3/MM3 (ref 0.2–1.2)
MONOCYTES NFR BLD AUTO: 10.3 % (ref 5–12)
NEUTROPHILS # BLD AUTO: 9.34 10*3/MM3 (ref 1.9–8.1)
NEUTROPHILS NFR BLD AUTO: 63.9 % (ref 42.7–76)
NRBC BLD MANUAL-RTO: 0 /100 WBC (ref 0–0)
PLATELET # BLD AUTO: 200 10*3/MM3 (ref 140–500)
PMV BLD AUTO: 10.5 FL (ref 6–12)
RBC # BLD AUTO: 3.68 10*6/MM3 (ref 3.9–5.2)
WBC NRBC COR # BLD: 14.6 10*3/MM3 (ref 4.5–10.7)

## 2017-11-10 PROCEDURE — 85025 COMPLETE CBC W/AUTO DIFF WBC: CPT | Performed by: OBSTETRICS & GYNECOLOGY

## 2017-11-10 PROCEDURE — 99232 SBSQ HOSP IP/OBS MODERATE 35: CPT | Performed by: OBSTETRICS & GYNECOLOGY

## 2017-11-10 RX ORDER — FLUOXETINE HYDROCHLORIDE 20 MG/1
20 CAPSULE ORAL DAILY
Status: DISCONTINUED | OUTPATIENT
Start: 2017-11-10 | End: 2017-11-11 | Stop reason: HOSPADM

## 2017-11-10 RX ADMIN — IBUPROFEN 800 MG: 800 TABLET ORAL at 18:37

## 2017-11-10 RX ADMIN — HYDROCODONE BITARTRATE AND ACETAMINOPHEN 1 TABLET: 5; 325 TABLET ORAL at 15:29

## 2017-11-10 RX ADMIN — DOCUSATE SODIUM 100 MG: 100 CAPSULE, LIQUID FILLED ORAL at 09:09

## 2017-11-10 RX ADMIN — FLUOXETINE HYDROCHLORIDE 20 MG: 20 CAPSULE ORAL at 12:09

## 2017-11-10 RX ADMIN — IBUPROFEN 800 MG: 800 TABLET ORAL at 09:10

## 2017-11-10 RX ADMIN — DOCUSATE SODIUM 100 MG: 100 CAPSULE, LIQUID FILLED ORAL at 18:37

## 2017-11-10 RX ADMIN — IBUPROFEN 800 MG: 800 TABLET ORAL at 00:55

## 2017-11-10 RX ADMIN — GUAIFENESIN 200 MG: 100 SOLUTION ORAL at 01:47

## 2017-11-10 RX ADMIN — Medication 1 TABLET: at 09:09

## 2017-11-10 RX ADMIN — GUAIFENESIN 200 MG: 100 SOLUTION ORAL at 20:47

## 2017-11-10 RX ADMIN — GUAIFENESIN 200 MG: 100 SOLUTION ORAL at 15:29

## 2017-11-10 RX ADMIN — HYDROCODONE BITARTRATE AND ACETAMINOPHEN 1 TABLET: 5; 325 TABLET ORAL at 09:09

## 2017-11-10 RX ADMIN — HYDROCODONE BITARTRATE AND ACETAMINOPHEN 1 TABLET: 5; 325 TABLET ORAL at 00:55

## 2017-11-10 RX ADMIN — HYDROCODONE BITARTRATE AND ACETAMINOPHEN 1 TABLET: 5; 325 TABLET ORAL at 23:44

## 2017-11-10 NOTE — PLAN OF CARE
Problem: Patient Care Overview (Adult)  Goal: Plan of Care Review  Outcome: Ongoing (interventions implemented as appropriate)    17 4883   Patient Care Overview   Progress improving   Coping/Psychosocial Response Interventions   Plan Of Care Reviewed With patient   Outcome Evaluation   Outcome Summary/Follow up Plan stable. pain controlled with meds. breast and bottle feeding. questions answered. no c/o or cncerns voiced.          Problem: Fall Risk  (Adult,Obstetrics,Pediatric)  Goal: Identify Related Risk Factors and Signs and Symptoms  Outcome: Ongoing (interventions implemented as appropriate)    Problem: Postpartum, Vaginal Delivery (Adult)  Goal: Signs and Symptoms of Listed Potential Problems Will be Absent or Manageable (Postpartum, Vaginal Delivery)  Outcome: Ongoing (interventions implemented as appropriate)

## 2017-11-10 NOTE — PROGRESS NOTES
"Subjective:  Postpartum Day 1:     The patient feels well.  Pain is well controlled with current medications. The baby is well.  Urinary output is adequate. The patient is ambulating well. The patient is tolerating a normal diet. Flatus denies been passed.      Objective:    Vital signs in last 24 hours:  Blood pressure 109/67, pulse 82, temperature 97.6 °F (36.4 °C), temperature source Oral, resp. rate 16, height 61\" (154.9 cm), weight 183 lb 6.4 oz (83.2 kg), last menstrual period 02/01/2017, SpO2 98 %, unknown if currently breastfeeding.      General:    alert, appears stated age and cooperative   Lungs CTAB   CV RRR, no m/r/g   Uterine Fundus:   firm   Incision:  healing well, no significant drainage, no dehiscence, no significant erythema         Labs  Lab Results   Component Value Date    WBC 14.60 (H) 11/10/2017    HGB 10.7 (L) 11/10/2017    HCT 33.2 (L) 11/10/2017    MCV 90.2 11/10/2017     11/10/2017        Assessment/Plan:.   Patient doing well, no complaints  Desires to restart Prozac 20mg daily for h/o depression/anxiety. Has tried Celexa in the past and has not tried Zoloft. Would prefer to use Prozac as she had good success.  Counseled on risks  1/2 ppd smoker - patient counseled on cessation.  Reports she has decreased from 1ppd to 1/2 ppd after getting pregnant. Declines offer for resources for smoking cessation. Counseled on health risks including risk to infant  Rh Negative - baby Rh negative  Dispo: inpatient, anticipate discharge tomorrow      Di Torres MD      "

## 2017-11-10 NOTE — LACTATION NOTE
This note was copied from a baby's chart.  Baby is getting formula per Mom while waiting for milk to come in. She is not seeing anything yet with HGP. Encouraged to pump every 2-3 hrs. Mom is falling asleep during conversation. LC# on whiteboard.

## 2017-11-10 NOTE — PLAN OF CARE
Problem: Patient Care Overview (Adult)  Goal: Plan of Care Review  Outcome: Ongoing (interventions implemented as appropriate)    11/10/17 5939   Patient Care Overview   Progress improving   Coping/Psychosocial Response Interventions   Plan Of Care Reviewed With patient   Outcome Evaluation   Outcome Summary/Follow up Plan ambulates in the ceja, pain under control       Goal: Adult Individualization and Mutuality  Outcome: Ongoing (interventions implemented as appropriate)  Goal: Discharge Needs Assessment  Outcome: Ongoing (interventions implemented as appropriate)    Problem: Postpartum, Vaginal Delivery (Adult)  Goal: Signs and Symptoms of Listed Potential Problems Will be Absent or Manageable (Postpartum, Vaginal Delivery)  Outcome: Ongoing (interventions implemented as appropriate)    Problem: Breastfeeding (Adult,NICU,[place],Obstetrics,Pediatric)  Goal: Signs and Symptoms of Listed Potential Problems Will be Absent or Manageable (Breastfeeding)  Outcome: Ongoing (interventions implemented as appropriate)

## 2017-11-11 VITALS
TEMPERATURE: 97.9 F | RESPIRATION RATE: 16 BRPM | HEART RATE: 90 BPM | DIASTOLIC BLOOD PRESSURE: 83 MMHG | OXYGEN SATURATION: 98 % | SYSTOLIC BLOOD PRESSURE: 121 MMHG | HEIGHT: 61 IN | WEIGHT: 183.4 LBS | BODY MASS INDEX: 34.63 KG/M2

## 2017-11-11 PROCEDURE — 99238 HOSP IP/OBS DSCHRG MGMT 30/<: CPT | Performed by: OBSTETRICS & GYNECOLOGY

## 2017-11-11 RX ORDER — FLUOXETINE HYDROCHLORIDE 20 MG/1
20 CAPSULE ORAL DAILY
Qty: 30 CAPSULE | Refills: 3 | Status: SHIPPED | OUTPATIENT
Start: 2017-11-12 | End: 2018-01-23 | Stop reason: SDUPTHER

## 2017-11-11 RX ORDER — PRENATAL VIT NO.126/IRON/FOLIC 28MG-0.8MG
1 TABLET ORAL DAILY
Qty: 30 TABLET | Refills: 12 | Status: SHIPPED | OUTPATIENT
Start: 2017-11-12

## 2017-11-11 RX ORDER — HYDROCODONE BITARTRATE AND ACETAMINOPHEN 5; 325 MG/1; MG/1
1 TABLET ORAL EVERY 6 HOURS PRN
Qty: 20 TABLET | Refills: 0 | Status: SHIPPED | OUTPATIENT
Start: 2017-11-11 | End: 2017-11-19

## 2017-11-11 RX ADMIN — DOCUSATE SODIUM 100 MG: 100 CAPSULE, LIQUID FILLED ORAL at 08:24

## 2017-11-11 RX ADMIN — FLUOXETINE HYDROCHLORIDE 20 MG: 20 CAPSULE ORAL at 08:24

## 2017-11-11 RX ADMIN — GUAIFENESIN 200 MG: 100 SOLUTION ORAL at 03:25

## 2017-11-11 RX ADMIN — Medication 1 TABLET: at 08:24

## 2017-11-11 RX ADMIN — IBUPROFEN 800 MG: 800 TABLET ORAL at 03:25

## 2017-11-11 RX ADMIN — HYDROCODONE BITARTRATE AND ACETAMINOPHEN 1 TABLET: 5; 325 TABLET ORAL at 06:52

## 2017-11-11 NOTE — DISCHARGE SUMMARY
Date of Discharge:  2017    Discharge Diagnosis:   Patient Active Problem List   Diagnosis   • Multigravida in third trimester   • Tobacco smoking affecting pregnancy in third trimester   • History of intravenous drug use in remission   • History of methamphetamine abuse   • Group B streptococcal carriage complicating pregnancy   • Pregnancy   • Postpartum care and examination immediately after delivery         Presenting Problem/History of Present Illness  Pregnancy [Z34.90]  Pregnancy [Z34.90]       Hospital Course  Patient is a 22 y.o. female  40w1d presented with labor.  For events surrounding her delivery please see delivery/op note.  Her postpartum course was uneventful and today PPD#3, she is ready for discharge.  She meets all milestones and criteria for discharge and instructions were reviewed and she voiced understanding. Prozac for depression and prenatal sent to the pharmacy     Procedures Performed  Vaginal delivery        Consults:   Consults     No orders found from 10/11/2017 to 11/10/2017.          Pertinent Test Results: None     Condition on Discharge:  Stable     Discharge Disposition  Home or Self Care    Discharge Medications   Lisa Echavarria   Home Medication Instructions NETTE:[id number]    Printed on:17 0926   Medication Information                      FLUoxetine (PROzac) 20 MG capsule  Take 1 capsule by mouth Daily.             HYDROcodone-acetaminophen (NORCO) 5-325 MG per tablet  Take 1 tablet by mouth Every 6 (Six) Hours As Needed for Moderate Pain  for up to 8 days.             Prenatal Vit-Fe Fumarate-FA (PRENATAL, CLASSIC, VITAMIN) 28-0.8 MG tablet tablet  Take 1 tablet by mouth Daily.                 Discharge Diet:   Diet Instructions     Advance Diet As Tolerated                     Activity at Discharge:   Activity Instructions     Pelvic Rest                     Follow-up Appointments  No future appointments.      Test Results Pending at Discharge        Theodroe Stewart MD  11/11/17  9:26 AM

## 2017-11-11 NOTE — PROGRESS NOTES
Postpartum Progress Note      Status post Vaginal Delivery: Doing well postoperatively.     1) postpartum care immediately following delivery : Doing well, discharge home today.       Rh status: Rh Negative. Infant Rh Negative, rhogam not needed.   Rubella: immune  Gender: Female     Subjective     Postpartum Day 2: Vaginal delivery    The patient feels well. The patient denies emotional concerns. Pain is well controlled with current medications. The baby is well. The patient is ambulating well. The patient is tolerating a normal diet.     Objective     Vital signs in last 24 hours:  Temp:  [97.9 °F (36.6 °C)-98 °F (36.7 °C)] 97.9 °F (36.6 °C)  Heart Rate:  [76-90] 90  Resp:  [16] 16  BP: (112-121)/(74-83) 121/83      General:    alert, appears stated age and cooperative   Abdomen:  Soft, Non-tender    Lochia:  appropriate   Uterine Fundus:   firm   Ext    Edema 1+   DVT Evaluation:  No evidence of DVT seen on physical exam.     Lab Results   Component Value Date    WBC 14.60 (H) 11/10/2017    HGB 10.7 (L) 11/10/2017    HCT 33.2 (L) 11/10/2017    MCV 90.2 11/10/2017     11/10/2017       Theodore Stewart MD  11/11/2017  9:22 AM

## 2017-11-11 NOTE — PLAN OF CARE
Problem: Patient Care Overview (Adult)  Goal: Plan of Care Review  Outcome: Ongoing (interventions implemented as appropriate)    17   Patient Care Overview   Progress improving   Coping/Psychosocial Response Interventions   Plan Of Care Reviewed With patient;significant other   Outcome Evaluation   Outcome Summary/Follow up Plan Vitals stable; ambulated frequently; bottle fed through night; pain well controlled       Goal: Adult Individualization and Mutuality  Outcome: Ongoing (interventions implemented as appropriate)    17   Individualization   Patient Specific Preferences Breast and bottle feeding   Patient Specific Goals Pain management       Goal: Discharge Needs Assessment  Outcome: Ongoing (interventions implemented as appropriate)    17   Discharge Needs Assessment   Concerns To Be Addressed no discharge needs identified   Readmission Within The Last 30 Days no previous admission in last 30 days   Discharge Disposition home or self-care         Problem: Postpartum, Vaginal Delivery (Adult)  Goal: Signs and Symptoms of Listed Potential Problems Will be Absent or Manageable (Postpartum, Vaginal Delivery)  Outcome: Ongoing (interventions implemented as appropriate)    17   Postpartum, Vaginal Delivery   Problems Assessed (Postpartum Vaginal Delivery) all   Problems Present (Postpartum Vaginal Delivery) pain         Problem: Breastfeeding (Adult,NICU,[place],Obstetrics,Pediatric)  Goal: Signs and Symptoms of Listed Potential Problems Will be Absent or Manageable (Breastfeeding)  Outcome: Ongoing (interventions implemented as appropriate)    17   Breastfeeding   Problems Assessed (Breastfeeding) all   Problems Present (Breastfeeding) none

## 2017-11-13 NOTE — PROGRESS NOTES
Continued Stay Note  [facility]     Patient Name: Lisa Echavarria  MRN: [id number]  Today's Date: 11/13/2017    Admit Date: 11/9/2017          Discharge Plan       11/13/17 0912    Case Management/Social Work Plan    Additional Comments Per CPS hotline/Lisa mother has active case in [facility] worker is Anel Lea [phone]. Spoke with Anel she reports she is active. Informed her pt delivered at [facility] and drug UA is negative on mom and baby. Will inform CPS worker of pending meconium. BRADLY Bernardo              Discharge Codes     None        Expected Discharge Date and Time     Expected Discharge Date Expected Discharge Time    Nov 11, 2017             Opal Pink

## 2017-11-16 NOTE — PROGRESS NOTES
Case Management Discharge Note    Final Note: Meconium postive. CPS report made. The Web Tracking # for this report is: Web Id # [id number]    Discharge Placement     No information found

## 2018-01-23 DIAGNOSIS — Z86.59 HISTORY OF DEPRESSION: ICD-10-CM

## 2018-01-23 RX ORDER — FLUOXETINE HYDROCHLORIDE 20 MG/1
CAPSULE ORAL
Qty: 30 CAPSULE | Refills: 0 | Status: SHIPPED | OUTPATIENT
Start: 2018-01-23

## 2023-03-22 ENCOUNTER — TELEPHONE (OUTPATIENT)
Dept: OBSTETRICS AND GYNECOLOGY | Facility: CLINIC | Age: 28
End: 2023-03-22
Payer: MEDICAID